# Patient Record
Sex: FEMALE | Race: BLACK OR AFRICAN AMERICAN | Employment: FULL TIME | URBAN - METROPOLITAN AREA
[De-identification: names, ages, dates, MRNs, and addresses within clinical notes are randomized per-mention and may not be internally consistent; named-entity substitution may affect disease eponyms.]

---

## 2017-05-08 ENCOUNTER — TELEPHONE (OUTPATIENT)
Dept: FAMILY MEDICINE CLINIC | Age: 27
End: 2017-05-08

## 2017-05-08 NOTE — TELEPHONE ENCOUNTER
----- Message from Alix Haile sent at 5/8/2017 11:30 AM EDT -----  Regarding: Dr. Gm Merida  Pt had requested to know her blood type if this is in her records from previous labs. Best contact number is [phone].

## 2023-05-20 RX ORDER — METHYLPREDNISOLONE 4 MG/1
TABLET ORAL
COMMUNITY
Start: 2015-02-23

## 2023-05-20 RX ORDER — ALBUTEROL SULFATE 90 UG/1
1 AEROSOL, METERED RESPIRATORY (INHALATION) EVERY 6 HOURS PRN
COMMUNITY
Start: 2012-07-31

## 2023-05-20 RX ORDER — DESOXIMETASONE 2.5 MG/G
CREAM TOPICAL 2 TIMES DAILY
COMMUNITY
Start: 2011-06-27

## 2023-05-20 RX ORDER — OFLOXACIN 3 MG/ML
5 SOLUTION AURICULAR (OTIC) 2 TIMES DAILY
COMMUNITY
Start: 2014-12-17

## 2023-05-20 RX ORDER — VALACYCLOVIR HYDROCHLORIDE 500 MG/1
500 TABLET, FILM COATED ORAL 2 TIMES DAILY
COMMUNITY
Start: 2014-12-17

## 2024-01-11 ENCOUNTER — OFFICE VISIT (OUTPATIENT)
Age: 34
End: 2024-01-11
Payer: MEDICAID

## 2024-01-11 VITALS
SYSTOLIC BLOOD PRESSURE: 118 MMHG | WEIGHT: 141 LBS | DIASTOLIC BLOOD PRESSURE: 78 MMHG | OXYGEN SATURATION: 98 % | BODY MASS INDEX: 25.95 KG/M2 | HEART RATE: 92 BPM | HEIGHT: 62 IN

## 2024-01-11 DIAGNOSIS — H81.12 BPPV (BENIGN PAROXYSMAL POSITIONAL VERTIGO), LEFT: Primary | ICD-10-CM

## 2024-01-11 DIAGNOSIS — R09.81 NASAL CONGESTION: ICD-10-CM

## 2024-01-11 PROCEDURE — 99213 OFFICE O/P EST LOW 20 MIN: CPT | Performed by: NURSE PRACTITIONER

## 2024-01-11 RX ORDER — MECLIZINE HCL 25 MG
TABLET,CHEWABLE ORAL
COMMUNITY
Start: 2024-01-03

## 2024-01-11 RX ORDER — FLUTICASONE PROPIONATE 50 MCG
1 SPRAY, SUSPENSION (ML) NASAL DAILY
Qty: 16 G | Refills: 2 | Status: SHIPPED | OUTPATIENT
Start: 2024-01-11

## 2024-01-11 ASSESSMENT — ENCOUNTER SYMPTOMS
COUGH: 1
SINUS PAIN: 1
GASTROINTESTINAL NEGATIVE: 1
SINUS PRESSURE: 1

## 2024-01-11 NOTE — PROGRESS NOTES
Subjective:    Mattie Temple   33 y.o.   1990     New Patient Visit  This is a 33 y.o. female who is here today to discuss issues with sinuses She states he had COVID just before trevon. She then developed sinus infection and was given zpack and Claritin, meclizine. She states meclizine was not helping so she has been using OTC dramamine instead. She states she has been dizzy and off balance, she states she still cannot drive. She states she can now focus her eyes on stuff, but she stated her head feels cloudy. She states she has to keep clearing her throat. She states she was getting worried because it has been ongoing for so long. She states she feels her eye sight is slowed as well.         Review of Systems  Review of Systems   Constitutional: Negative.    HENT:  Positive for congestion, sinus pressure and sinus pain.    Eyes:  Positive for visual disturbance.   Respiratory:  Positive for cough.    Cardiovascular: Negative.    Gastrointestinal: Negative.    Endocrine: Negative.    Genitourinary: Negative.    Musculoskeletal: Negative.    Skin: Negative.    Allergic/Immunologic: Positive for environmental allergies.   Neurological:  Positive for dizziness.   Hematological: Negative.    Psychiatric/Behavioral: Negative.             No past medical history on file.  No past surgical history on file.   No family history on file.  Social History     Tobacco Use    Smoking status: Not on file    Smokeless tobacco: Not on file   Substance Use Topics    Alcohol use: Not on file      Prior to Admission medications    Medication Sig Start Date End Date Taking? Authorizing Provider   albuterol sulfate HFA (PROVENTIL;VENTOLIN;PROAIR) 108 (90 Base) MCG/ACT inhaler Inhale 1 puff into the lungs every 6 hours as needed 7/31/12   Automatic Reconciliation, Ar   desoximetasone (TOPICORT) 0.25 % cream Apply topically 2 times daily 6/27/11   Automatic Reconciliation, Ar   methylPREDNISolone (MEDROL DOSEPACK) 4 MG tablet

## 2024-01-25 ENCOUNTER — OFFICE VISIT (OUTPATIENT)
Age: 34
End: 2024-01-25
Payer: MEDICAID

## 2024-01-25 VITALS
DIASTOLIC BLOOD PRESSURE: 60 MMHG | WEIGHT: 141 LBS | HEART RATE: 86 BPM | RESPIRATION RATE: 16 BRPM | OXYGEN SATURATION: 99 % | HEIGHT: 62 IN | BODY MASS INDEX: 25.95 KG/M2 | SYSTOLIC BLOOD PRESSURE: 120 MMHG

## 2024-01-25 DIAGNOSIS — G43.809 VESTIBULAR MIGRAINE: ICD-10-CM

## 2024-01-25 DIAGNOSIS — H81.12 BPPV (BENIGN PAROXYSMAL POSITIONAL VERTIGO), LEFT: Primary | ICD-10-CM

## 2024-01-25 PROCEDURE — 99213 OFFICE O/P EST LOW 20 MIN: CPT | Performed by: NURSE PRACTITIONER

## 2024-01-25 RX ORDER — TOPIRAMATE 50 MG/1
25 TABLET, FILM COATED ORAL DAILY
Qty: 60 TABLET | Refills: 0 | Status: SHIPPED | OUTPATIENT
Start: 2024-01-25

## 2024-01-25 ASSESSMENT — ENCOUNTER SYMPTOMS
COUGH: 1
SINUS PRESSURE: 1
SINUS PAIN: 1
GASTROINTESTINAL NEGATIVE: 1

## 2024-01-25 NOTE — PROGRESS NOTES
Subjective:    Mattie Temple   33 y.o.   1990     New Patient Visit  1/11/2024 This is a 33 y.o. female who is here today to discuss issues with sinuses She states he had COVID just before trevon. She then developed sinus infection and was given zpack and Claritin, meclizine. She states meclizine was not helping so she has been using OTC dramamine instead. She states she has been dizzy and off balance, she states she still cannot drive. She states she can now focus her eyes on stuff, but she stated her head feels cloudy. She states she has to keep clearing her throat. She states she was getting worried because it has been ongoing for so long. She states she feels her eye sight is slowed as well.     1/24/2024- Ms Temple returns today for follow-up. She states she is doing better today. She has not had to take any medication for dizziness in two days. She states she is still somewhat slow moving around, but otherwise she feels she is getting better. The dizziness has gotten less intense. She states it will still last pretty much all day, but isn't so bad she cannot function.     Review of Systems  Review of Systems   Constitutional: Negative.    HENT:  Positive for congestion, sinus pressure and sinus pain.    Eyes:  Positive for visual disturbance.   Respiratory:  Positive for cough.    Cardiovascular: Negative.    Gastrointestinal: Negative.    Endocrine: Negative.    Genitourinary: Negative.    Musculoskeletal: Negative.    Skin: Negative.    Allergic/Immunologic: Positive for environmental allergies.   Neurological:  Positive for dizziness.   Hematological: Negative.    Psychiatric/Behavioral: Negative.             History reviewed. No pertinent past medical history.  History reviewed. No pertinent surgical history.   History reviewed. No pertinent family history.  Social History     Tobacco Use    Smoking status: Never    Smokeless tobacco: Never   Substance Use Topics    Alcohol use: Never

## 2024-02-12 ENCOUNTER — OFFICE VISIT (OUTPATIENT)
Age: 34
End: 2024-02-12
Payer: MEDICAID

## 2024-02-12 VITALS
RESPIRATION RATE: 17 BRPM | HEIGHT: 62 IN | BODY MASS INDEX: 25.79 KG/M2 | OXYGEN SATURATION: 99 % | SYSTOLIC BLOOD PRESSURE: 120 MMHG | DIASTOLIC BLOOD PRESSURE: 80 MMHG | HEART RATE: 83 BPM

## 2024-02-12 DIAGNOSIS — R09.82 POST-NASAL DRIP: ICD-10-CM

## 2024-02-12 DIAGNOSIS — R42 VERTIGO: Primary | ICD-10-CM

## 2024-02-12 DIAGNOSIS — R09.81 NASAL CONGESTION: ICD-10-CM

## 2024-02-12 PROCEDURE — 99212 OFFICE O/P EST SF 10 MIN: CPT | Performed by: STUDENT IN AN ORGANIZED HEALTH CARE EDUCATION/TRAINING PROGRAM

## 2024-02-12 NOTE — PROGRESS NOTES
Subjective:   Mattie Temple   33 y.o.   1990     Refered by: No referring provider defined for this encounter.     New Patient Visit  Chief Compliant: vertigo    History of Present Illness:  Mattie Temple is a 33 y.o. female with no reported past medical history, who presents today for vertigo.     Patient was previous please seen by Laith Cifuentes NP.    1/11/2024 This is a 33 y.o. female who is here today to discuss issues with sinuses She states he had COVID just before trevon. She then developed sinus infection and was given zpack and Claritin, meclizine. She states meclizine was not helping so she has been using OTC dramamine instead. She states she has been dizzy and off balance, she states she still cannot drive. She states she can now focus her eyes on stuff, but she stated her head feels cloudy. She states she has to keep clearing her throat. She states she was getting worried because it has been ongoing for so long. She states she feels her eye sight is slowed as well.      1/24/2024- Ms Temple returns today for follow-up. She states she is doing better today. She has not had to take any medication for dizziness in two days. She states she is still somewhat slow moving around, but otherwise she feels she is getting better. The dizziness has gotten less intense. She states it will still last pretty much all day, but isn't so bad she cannot function.     Interval Hx:   2/12/24:   Episodes of vertigo now completely resolved.  Feeling much better.  Complaining of some nasal congestion.  Has occasionally used Flonase before, not currently using.    Review of Systems  Consitutional: denies fever, excessive weight gain or loss.  Eyes: denies diplopia, eye pain.  Integumentary: denies new concerning skin lesions.  Ears, Nose, Mouth, Throat: denies except as per HPI.  Endocrine: denies hot or cold intolerance, increased thirst.  Respiratory: denies cough, hemoptysis, wheezing  Gastrointestinal: denies

## 2024-02-12 NOTE — PROGRESS NOTES
Identified pt with two pt identifiers(name and ). Reviewed record in preparation for visit and have obtained necessary documentation. All patient medications has been reviewed.  Chief Complaint   Patient presents with    Vestibular migraines       Vitals:    24 1606   BP: 120/80   Pulse: 83   Resp: 17   SpO2: 99%                   Coordination of Care Questionnaire:   1) Have you been to an emergency room, urgent care, or hospitalized since your last visit?   no       2. Have seen or consulted any other health care provider since your last visit? no        Patient is accompanied by self I have received verbal consent from Mattie Temple to discuss any/all medical information while they are present in the room.

## 2025-08-05 ENCOUNTER — TELEPHONE (OUTPATIENT)
Facility: CLINIC | Age: 35
End: 2025-08-05